# Patient Record
Sex: FEMALE | Race: WHITE | HISPANIC OR LATINO | Employment: UNEMPLOYED | URBAN - METROPOLITAN AREA
[De-identification: names, ages, dates, MRNs, and addresses within clinical notes are randomized per-mention and may not be internally consistent; named-entity substitution may affect disease eponyms.]

---

## 2024-09-29 ENCOUNTER — HOSPITAL ENCOUNTER (EMERGENCY)
Facility: HOSPITAL | Age: 49
Discharge: HOME | End: 2024-09-29
Admitting: EMERGENCY MEDICINE
Payer: MEDICAID

## 2024-09-29 VITALS
WEIGHT: 256.17 LBS | HEIGHT: 63 IN | SYSTOLIC BLOOD PRESSURE: 137 MMHG | OXYGEN SATURATION: 94 % | RESPIRATION RATE: 13 BRPM | BODY MASS INDEX: 45.39 KG/M2 | TEMPERATURE: 98.2 F | DIASTOLIC BLOOD PRESSURE: 73 MMHG | HEART RATE: 64 BPM

## 2024-09-29 DIAGNOSIS — K59.03 DRUG-INDUCED CONSTIPATION: Primary | ICD-10-CM

## 2024-09-29 DIAGNOSIS — F11.11 HISTORY OF OPIOID ABUSE: ICD-10-CM

## 2024-09-29 LAB — GLUCOSE BLDC GLUCOMTR-MCNC: 99 MG/DL (ref 70–105)

## 2024-09-29 PROCEDURE — 99284 EMERGENCY DEPT VISIT MOD MDM: CPT

## 2024-09-29 PROCEDURE — 82948 REAGENT STRIP/BLOOD GLUCOSE: CPT

## 2024-09-29 PROCEDURE — 63710000001 ONDANSETRON ODT 4 MG TABLET DISPERSIBLE: Performed by: NURSE PRACTITIONER

## 2024-09-29 RX ORDER — ONDANSETRON 4 MG/1
4 TABLET, ORALLY DISINTEGRATING ORAL ONCE
Status: COMPLETED | OUTPATIENT
Start: 2024-09-29 | End: 2024-09-29

## 2024-09-29 RX ORDER — SORBITOL SOLUTION 70 %
30 SOLUTION, ORAL MISCELLANEOUS ONCE
Status: COMPLETED | OUTPATIENT
Start: 2024-09-29 | End: 2024-09-29

## 2024-09-29 RX ADMIN — SORBITOL SOLUTION (BULK) 30 ML: 70 SOLUTION at 21:43

## 2024-09-29 RX ADMIN — ONDANSETRON 4 MG: 4 TABLET, ORALLY DISINTEGRATING ORAL at 21:57

## 2024-09-30 NOTE — DISCHARGE INSTRUCTIONS
You need to have a strict bowel regimen with the Suboxone as she will continue to be constipated.    Drink lots of water.  Increase the fiber in your diet.    You need to take the Dulcolax and Colace as well as may be some occasional magnesium citrate for constipation.    See the provider at the facility for recheck in the next 24 hours return if worse

## 2024-09-30 NOTE — ED PROVIDER NOTES
Subjective   History of Present Illness  Patient is a 49-year-old female who presents today with constipation for the past 6 days patient.  Reports she is 2 weeks clean from fentanyl, and started on Suboxone on 9/16.  She reports she was having bowel movements and then they became less frequent until now she has not had one for 6 days.  Patient reports she has taken over-the-counter MiraLAX, Colace, and given herself 1 enema today with a very small bowel movement afterwards.  Patient reports some increased abdominal bloating and pain to the left lower quadrant.  Patient reports some nausea but denies any vomiting, fever.  Denies chest pain or shortness of air.  Patient reports she is at [facility].       Review of Systems   Constitutional:  Negative for fever.   Gastrointestinal:  Positive for abdominal distention, abdominal pain, constipation and nausea. Negative for vomiting.       History reviewed. No pertinent past medical history.    Allergies   Allergen Reactions    Actos [Pioglitazone] Nausea And Vomiting    Diamox Sequels [Acetazolamide] Nausea And Vomiting    Penicillins Hives    Prednisone Other (See Comments)     Hyperglycemia. Pt is DM2    Topiramate Confusion    Tramadol Hives       History reviewed. No pertinent surgical history.    History reviewed. No pertinent family history.    Social History     Socioeconomic History    Marital status: Single           Objective   Physical Exam  Vitals and nursing note reviewed.   Constitutional:       Appearance: Normal appearance.   HENT:      Head: Normocephalic.   Eyes:      Extraocular Movements: Extraocular movements intact.      Conjunctiva/sclera: Conjunctivae normal.      Pupils: Pupils are equal, round, and reactive to light.   Cardiovascular:      Rate and Rhythm: Normal rate and regular rhythm.   Pulmonary:      Effort: Pulmonary effort is normal.      Breath sounds: Normal breath sounds.   Abdominal:      General: Bowel sounds are normal.  "There is distension.      Palpations: Abdomen is soft.      Tenderness: There is abdominal tenderness in the left lower quadrant. There is no guarding or rebound.      Hernia: A hernia is present. Hernia is present in the ventral area.          Comments: Positive for abdominal distention, tenderness to left lower quadrant.  Patient has a large right ventral hernia that is easily reducible.    Skin:     General: Skin is warm and dry.      Capillary Refill: Capillary refill takes less than 2 seconds.   Neurological:      General: No focal deficit present.      Mental Status: She is alert and oriented to person, place, and time.   Psychiatric:         Mood and Affect: Mood normal.         Behavior: Behavior normal.         Procedures           ED Course                                   /73 (BP Location: Right arm, Patient Position: Lying)   Pulse 64   Temp 98.2 °F (36.8 °C) (Oral)   Resp 13   Ht 160 cm (63\")   Wt 116 kg (256 lb 2.8 oz)   SpO2 94%   BMI 45.38 kg/m²   Labs Reviewed   POCT GLUCOSE FINGERSTICK - Normal     Medications   sorbitol solution 30 mL (30 mL Oral Given 9/29/24 2143)   ondansetron ODT (ZOFRAN-ODT) disintegrating tablet 4 mg (4 mg Oral Given 9/29/24 2157)     No radiology results for the last day            Medical Decision Making  Patient is a 49-year-old female with above exam who has constipation due to her Suboxone use.  Patient was given sorbitol as well as a soapsuds enema here in the emergency room and had large bowel movements and states she feels much better.  She was given instructions on bowel regimen and to see the provider at the facility for recheck in 24 to 48 hours increase fiber in her diet as well as a clear liquids and return if worse they verbalized understood discharge instructions    Amount and/or Complexity of Data Reviewed  ECG/medicine tests: ordered and independent interpretation performed. Decision-making details documented in ED Course.    Risk  OTC " drugs.  Prescription drug management.        Final diagnoses:   Drug-induced constipation   History of opioid abuse       ED Disposition  ED Disposition       ED Disposition   Discharge    Condition   Stable    Comment   --               [facility]  [phone]  Call            Medication List      No changes were made to your prescriptions during this visit.            Amelia Ashby, APRN  09/29/24 [id number]